# Patient Record
Sex: MALE | Race: WHITE | NOT HISPANIC OR LATINO | Employment: OTHER | URBAN - METROPOLITAN AREA
[De-identification: names, ages, dates, MRNs, and addresses within clinical notes are randomized per-mention and may not be internally consistent; named-entity substitution may affect disease eponyms.]

---

## 2021-10-08 ENCOUNTER — OFFICE VISIT (OUTPATIENT)
Dept: ORTHOPEDIC SURGERY | Facility: CLINIC | Age: 51
End: 2021-10-08

## 2021-10-08 VITALS
HEART RATE: 48 BPM | DIASTOLIC BLOOD PRESSURE: 80 MMHG | SYSTOLIC BLOOD PRESSURE: 133 MMHG | WEIGHT: 164.8 LBS | BODY MASS INDEX: 24.98 KG/M2 | HEIGHT: 68 IN

## 2021-10-08 DIAGNOSIS — S73.192A TEAR OF LEFT ACETABULAR LABRUM, INITIAL ENCOUNTER: Primary | ICD-10-CM

## 2021-10-08 DIAGNOSIS — M25.552 LEFT HIP PAIN: ICD-10-CM

## 2021-10-08 DIAGNOSIS — M16.12 PRIMARY OSTEOARTHRITIS OF LEFT HIP: ICD-10-CM

## 2021-10-08 PROCEDURE — 99203 OFFICE O/P NEW LOW 30 MIN: CPT | Performed by: ORTHOPAEDIC SURGERY

## 2021-10-08 RX ORDER — CETIRIZINE HYDROCHLORIDE 10 MG/1
10 TABLET ORAL DAILY
COMMUNITY

## 2021-10-08 RX ORDER — ACETAMINOPHEN 325 MG/1
650 TABLET ORAL EVERY 6 HOURS PRN
COMMUNITY

## 2021-10-08 RX ORDER — MULTIVIT WITH MINERALS/LUTEIN
250 TABLET ORAL DAILY
COMMUNITY

## 2021-10-08 RX ORDER — CHLORAL HYDRATE 500 MG
CAPSULE ORAL
COMMUNITY

## 2021-10-08 RX ORDER — OMEGA-3S/DHA/EPA/FISH OIL/D3 300MG-1000
400 CAPSULE ORAL DAILY
COMMUNITY

## 2021-10-08 RX ORDER — ZINC SULFATE 50(220)MG
220 CAPSULE ORAL DAILY
COMMUNITY

## 2021-10-08 NOTE — PROGRESS NOTES
General Exam    Patient: Fitz Whitlock    YOB: 1970    Medical Record Number: [id number]    Chief Complaints: Left hip pain    History of Present Illness:     50 y.o. male patient who presents for history of left hip pain.  [facility].  Patient states he has had hip issues since August 2020.  The 21st are noted some groin pain discomfort.  He continues to be quite active he still is able to bike over 100 miles per week and works out regularly.  He like to get back to running at some point as last time he ran was almost a year ago when he did a 10 mile race after which he can almost not even walk.  At rest he usually does well and normal daily activities to which pains long as he is maintaining walking a straight line.  He has done some physical therapy in the past but did not feel much pain resolution.  Here discuss treatment options.    Denies any numbness or tingling.  Denies any fevers, cough or shortness of breath.    Allergies: No Known Allergies    Home Medications:      Current Outpatient Medications:   •  acetaminophen (TYLENOL) 325 MG tablet, Take 650 mg by mouth Every 6 (Six) Hours As Needed for Mild Pain ., Disp: , Rfl:   •  cetirizine (zyrTEC) 10 MG tablet, Take 10 mg by mouth Daily., Disp: , Rfl:   •  cholecalciferol (VITAMIN D3) 10 MCG (400 UNIT) tablet, Take 400 Units by mouth Daily., Disp: , Rfl:   •  Omega-3 Fatty Acids (fish oil) 1000 MG capsule capsule, Take  by mouth Daily With Breakfast., Disp: , Rfl:   •  vitamin C (ASCORBIC ACID) 250 MG tablet, Take 250 mg by mouth Daily., Disp: , Rfl:   •  zinc sulfate (ZINCATE) 220 (50 Zn) MG capsule, Take 220 mg by mouth Daily., Disp: , Rfl:     History reviewed. No pertinent past medical history.    Past Surgical History:   Procedure Laterality Date   • NASAL SEPTUM SURGERY     • SHOULDER SURGERY      LT SH RCR biceps tendon    • SINUS SURGERY      x2   • SKIN BIOPSY      Rt arm        Social  "History     Occupational History   • Not on file   Tobacco Use   • Smoking status: Never Smoker   • Smokeless tobacco: Never Used   Vaping Use   • Vaping Use: Never used   Substance and Sexual Activity   • Alcohol use: Yes   • Drug use: Never   • Sexual activity: Defer      Social History     Social History Narrative   • Not on file       History reviewed. No pertinent family history.    Review of Systems:      Constitutional: Denies fever, shaking or chills         All other pertinent positives and negatives as noted above in HPI.    Physical Exam: 50 y.o. male    Vitals:    10/08/21 0925   BP: 133/80   Pulse: (!) 48   Weight: 74.8 kg (164 lb 12.8 oz)   Height: 172.7 cm (68\")       General:  Patient is awake and alert.  Appears in no acute distress or discomfort.        Musculoskeletal/Extremities:    Left lower extremity: Overall gait does appear normal unassisted.  No chest palpation about the hip.  Hip range of motion slight decrease in internal rotation versus contralateral side.  Negative MANDY test, positive FADIR test.  Negative logroll.  Strength 5 out of 5 to muscle test.  Station tact is light touch.         Radiology:       AP pelvis, AP hip and lateral taken reviewed to evaluate the patient's complaint/s.    Imaging demonstrates moderate degenerative changes of the left hip joint with joint space narrowing and some early cystic changes involving the superior lateral acetabular rim.  On lateral view there is evidence of cam deformity along the femoral head neck junction.  Did not overly appreciate any acetabular retroversion.      MRI of the left hip was uploaded does appear that there is signal intensity on oblique axial view involving the anterior superior labrum.  Given the arthritic changes may be more degenerative in nature.  Do not have formal radiology report        Assessment: Left hip osteoarthritis with labral tear      Plan:      Discussed the findings with the patient I do believe he has some " true hip joint pathology.  X-ray demonstrates some arthritic changes given loss of joint space as well as some clinical exam and symptoms given.  Also MRI is suggested for and [facility] that [facility] is a [facility]ral [facility] present.  Given his degree of arthritis I am not sure that labral repair reconstruction would add much benefit to him.  He is highly active individual and wants to get back to running.  Told him I can refer him to another surgeon who has more experience with labral tears and is able to perform reconstruction labral surgery if warranted this is something I do not do.  However during the interim I recommend some conservative treatment consisting of anti-inflammatory symptom management we can order a image guided hip injection for the left hip and see how patient does.  Given the fact that he was a [facility] will discuss my office possibly get him referred to Dr. Manan Workman for further evaluation and management.  I discussed the patient that if labral surgery is not an option is best to continue conservative treatments until symptoms disrupt normal daily activities at which point total hip arthroplasty may be an option down the road given presence of arthritis and hip symptoms.  Patient is wife understood the plan all questions were answered.  Patient will follow up as needed.          All questions were answered.  Patient understands and agrees with the plan.    Luis Altman MD    10/08/2021    CC to Provider, No Known

## 2021-10-18 ENCOUNTER — OFFICE VISIT (OUTPATIENT)
Dept: ORTHOPEDIC SURGERY | Facility: CLINIC | Age: 51
End: 2021-10-18

## 2021-10-18 VITALS — HEIGHT: 68 IN | WEIGHT: 166 LBS | BODY MASS INDEX: 25.16 KG/M2

## 2021-10-18 DIAGNOSIS — M16.12 PRIMARY OSTEOARTHRITIS OF LEFT HIP: Primary | ICD-10-CM

## 2021-10-18 DIAGNOSIS — M24.152 DEGENERATIVE TEAR OF ACETABULAR LABRUM OF LEFT HIP: ICD-10-CM

## 2021-10-18 PROCEDURE — 99214 OFFICE O/P EST MOD 30 MIN: CPT | Performed by: FAMILY MEDICINE

## 2021-10-18 PROCEDURE — 20611 DRAIN/INJ JOINT/BURSA W/US: CPT | Performed by: FAMILY MEDICINE

## 2021-10-18 RX ORDER — TRIAMCINOLONE ACETONIDE 40 MG/ML
80 INJECTION, SUSPENSION INTRA-ARTICULAR; INTRAMUSCULAR
Status: COMPLETED | OUTPATIENT
Start: 2021-10-18 | End: 2021-10-18

## 2021-10-18 RX ADMIN — TRIAMCINOLONE ACETONIDE 80 MG: 40 INJECTION, SUSPENSION INTRA-ARTICULAR; INTRAMUSCULAR at 10:55

## 2021-10-18 NOTE — PROGRESS NOTES
Procedure   Large Joint Arthrocentesis: L hip joint  Date/Time: 10/18/2021 10:55 AM  Consent given by: patient  Site marked: site marked  Timeout: Immediately prior to procedure a time out was called to verify the correct patient, procedure, equipment, support staff and site/side marked as required   Supporting Documentation  Indications: pain   Procedure Details  Location: hip - L hip joint  Preparation: Patient was prepped and draped in the usual sterile fashion  Needle size: 22 G (spinal)  Approach: anterior (Ultraosund guidance was used to visualize vascular structed, care was taken to avoid there structures. Please see US machine for saved images.)  Medications administered: 80 mg triamcinolone acetonide 40 MG/ML (2cc of 2% lidocaine without epinepherine and 2cc of 40mg kenalog)  Patient tolerance: patient tolerated the procedure well with no immediate complications

## 2021-10-18 NOTE — PROGRESS NOTES
"Primary Care Sports Medicine Office Visit Note     Patient ID: Fitz Whitlock is a 50 y.o. male.    Chief Complaint:  Chief Complaint   Patient presents with   • Left Hip - Pain     HPI:    Mr. Fitz Whitlock is a 50 y.o. male who presents to the clinic today for left intra-articular hip injection.  The patient has previously seen my colleague, Dr. Altman, and was diagnosed with primary osteoarthritis of the left hip, with tear of left acetabular labrum.  He was referred to me for ultrasound-guided intra-articular corticosteroid injection.  The patient states he has not had any change in pain or symptomatology since previous evaluation.  Please see previous notes for further details.    No past medical history on file.    Past Surgical History:   Procedure Laterality Date   • NASAL SEPTUM SURGERY     • SHOULDER SURGERY      LT SH RCR biceps tendon    • SINUS SURGERY      x2   • SKIN BIOPSY      Rt arm        No family history on file.  Social History     Occupational History   • Not on file   Tobacco Use   • Smoking status: Never Smoker   • Smokeless tobacco: Never Used   Vaping Use   • Vaping Use: Never used   Substance and Sexual Activity   • Alcohol use: Yes   • Drug use: Never   • Sexual activity: Defer      Review of Systems   Constitutional: Negative for activity change, fatigue and fever.   Musculoskeletal: Positive for arthralgias.   Skin: Negative for color change and rash.   Neurological: Negative for numbness.     Objective:    Ht 172.7 cm (68\")   Wt 75.3 kg (166 lb)   BMI 25.24 kg/m²     Physical Examination:  Physical Exam  Vitals and nursing note reviewed.   Constitutional:       General: He is not in acute distress.     Appearance: He is well-developed. He is not diaphoretic.   HENT:      Head: Normocephalic and atraumatic.   Eyes:      Conjunctiva/sclera: Conjunctivae normal.   Pulmonary:      Effort: Pulmonary effort is normal. No respiratory distress.   Musculoskeletal:      Lumbar " back: Negative left straight leg raise test.   Skin:     General: Skin is warm.      Capillary Refill: Capillary refill takes less than 2 seconds.   Neurological:      Mental Status: He is alert.       Left Knee Exam     Range of Motion   The patient has normal left knee ROM.      Left Hip Exam     Tenderness   The patient is experiencing tenderness in the anterior.    Range of Motion   Abduction: normal   Adduction: normal   Flexion: normal   External rotation: normal   Internal rotation: normal     Muscle Strength   Abduction: 5/5   Adduction: 5/5   Flexion: 5/5     Tests   MANDY: positive  Fadir:  Negative FADIR test    Other   Erythema: absent  Sensation: normal  Pulse: present    Comments:  Mildly positive log roll, neg stenchfield, strongly positive scour.      Back Exam     Tenderness   The patient is experiencing no tenderness.     Range of Motion   The patient has normal back ROM.    Muscle Strength   The patient has normal back strength.  Right Quadriceps:  5/5   Right Hamstrings:  5/5     Tests   Straight leg raise left: negative    Reflexes   Patellar: normal    Other   Sensation: normal  Erythema: no back redness        Imaging and other tests:  2 view XR of the left hip dated 10/8/2021 yields moderate to severe osteoarthritic disease.  No other acute findings.    Assessment and Plan:    1. Primary osteoarthritis of left hip    2. 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  With known osteoarthritic change, and labral pathology, he is likely a good candidate for corticosteroid injection. After discussion of risks and benefits, the patient elected to proceed with ultrasound-guided corticosteroid injection to the left hip.  The patient tolerated this procedure well without any complaints or problems.  I recommended continuation of conservative management as previous, RTC in 3-6 months or sooner if symptoms recur.    Ronen CHAVEZ  "\"Chance\" Ray SLATER DO, CAQSM  10/18/21  09:48 EDT    Disclaimer: Please note that areas of this note were completed with computer voice recognition software.  Quite often unanticipated grammatical, syntax, homophones, and other interpretive errors are inadvertently transcribed by the computer software. Please excuse any errors that have escaped final proofreading.  "

## 2021-10-22 ENCOUNTER — PATIENT ROUNDING (BHMG ONLY) (OUTPATIENT)
Dept: ORTHOPEDIC SURGERY | Facility: CLINIC | Age: 51
End: 2021-10-22

## 2021-10-22 NOTE — PROGRESS NOTES
October 22, 2021    Hello, may I speak with Fitz Whitlock?    My name is EJ HERRING.     I am  with [facility]  [address] [place] [address] [place] [id number].    Before we get started may I verify your date of birth? 1970    I am calling to officially welcome you to our practice and ask about your recent visit. Is this a good time to talk? NO- VOICEMAIL LEFT    Tell me about your visit with us. What things went well?         We're always looking for ways to make our patients' experiences even better. Do you have recommendations on ways we may improve?      Overall were you satisfied with your first visit to our practice?       I appreciate you taking the time to speak with me today. Is there anything else I can do for you?       Thank you, and have a great day.